# Patient Record
Sex: MALE | Race: BLACK OR AFRICAN AMERICAN | Employment: PART TIME | URBAN - METROPOLITAN AREA
[De-identification: names, ages, dates, MRNs, and addresses within clinical notes are randomized per-mention and may not be internally consistent; named-entity substitution may affect disease eponyms.]

---

## 2017-11-02 ENCOUNTER — HOSPITAL ENCOUNTER (EMERGENCY)
Facility: CLINIC | Age: 22
Discharge: HOME | End: 2017-11-02
Attending: EMERGENCY MEDICINE | Admitting: EMERGENCY MEDICINE
Payer: OTHER MISCELLANEOUS

## 2017-11-02 VITALS
SYSTOLIC BLOOD PRESSURE: 146 MMHG | HEIGHT: 72 IN | DIASTOLIC BLOOD PRESSURE: 82 MMHG | RESPIRATION RATE: 14 BRPM | TEMPERATURE: 97.4 F | WEIGHT: 170 LBS | OXYGEN SATURATION: 100 % | HEART RATE: 86 BPM | BODY MASS INDEX: 23.03 KG/M2

## 2017-11-02 LAB — HIV EXPOSURE DRAW AND HOLD: NORMAL

## 2017-11-02 PROCEDURE — 99283 EMERGENCY DEPT VISIT LOW MDM: CPT

## 2017-11-02 RX ORDER — EMTRICITABINE AND TENOFOVIR DISOPROXIL FUMARATE 200; 300 MG/1; MG/1
1 TABLET, FILM COATED ORAL DAILY
Qty: 30 TABLET | Refills: 0 | Status: SHIPPED | OUTPATIENT
Start: 2017-11-02

## 2017-11-02 NOTE — DISCHARGE INSTRUCTIONS
Body Fluid Exposure (Healthcare Worker)  Two serious illnesses can be transmitted to a healthcare worker through body fluid exposure:    HIV    Hepatitis (types B, C and D)  Most healthcare workers exposed to a patient's body fluid do not get infected. However, exposure must be taken very seriously. Both HIV and hepatitis virus infection can lead to chronic illness and death.  Transmission risk depends on the type of exposure.     The risk of transmission following a needle stick from an HIV positive source is 0.3% (3 out of 1000 exposures).    The risk of transmission following a mucous membrane exposure to an HIV positive source is 0.09% (9 out of 10,000 exposures).    Transmission risk from a hepatitis-B positive source to a non-immunized worker following a needle-stick injury is 6% to 30% (6 to 30 out of 100 exposures).    Transmission risk from a hepatitis C positive source following a needle-stick injury is 0% to 7% (0 to 7 out of 100 exposures), and rare with mucous membrane exposure.  If you are in a sexual relationship, discuss your exposure and its risks with your partner. Consider abstaining from sex or using condoms and avoiding pregnancy until test results of the person who exposed you is negative, or your follow-up testing is done. Do not donate blood, tissue, or semen. If you are a woman who is breast feeding, discuss the risks to your infant with your doctor.  Testing  Initial testing for HIV and hepatitis status will be done both on you and the source, if known. This will establish your HIV and hepatitis status today. If the source is positive or unknown, and your initial results are negative, you will need follow-up blood tests to find out if transmission has occurred. It can take up to 8 weeks for blood tests to turn positive for hepatitis. If HIV infection has occurred, the test usually becomes positive by 3 months after exposure, but a positive result could be delayed up to 6 months after  exposure. Therefore, repeat HIV testing may be done in 6 and 12 weeks, and again at 6 months after exposure, according to your employer's policies. If tests are negative for hepatitis and HIV on final follow-up testing, you can assume that you were not infected as a result of this exposure.  Post-exposure prophylaxis (PEP)  If you have not already been immunized against hepatitis B, you will be offered the vaccine. If you have already been immunized, your antibody status will be determined. Treatment advice will be given based on your antibody status and that of the source patient, if known.   There is no preventive treatment or vaccine for hepatitis C or D.    Based on the time since exposure, the type of  exposure and the HIV status of the patient, if known, preventive treatment with antiviral medicine may be offered. Treatment consists of 2 or 3 oral medicines taken 1 to 3 times a day for 4 weeks. It is recommended to start the treatment as soon as possible after the exposure. Since treatment may be started before test results are known, treatment can be stopped if the source patient test results are negative.  Facts you need to know before making a treatment decision    There is only limited information about the effectiveness of drugs used for post-exposure prophylaxis  and their toxicity in persons without HIV infection.    Although the short-term toxicity of anti-viral drugs is usually limited, serious adverse events have occurred.    Be sure you understand the risk of transmission of disease and the risks of treatment before making your decision. If you are not sure, you can discuss this further with the Employee Health Staff. They can guide you to additional resources.    You may refuse or stop post-exposure prophylaxis treatment at any time.  When to seek medical advice  Call your healthcare provider right away if any of these occur:     Unexplained fever over 100.4 F (38.0 C) or as advised    Swollen  lymph glands    Sore throat    Rash    Muscle or joint aching    Prolonged or recurring diarrhea, nausea, or vomiting    Frequent headaches    Dark urine or light colored stools; or, jaundice (yellow color to skin or eyes)    Abdominal pain    Unusual and prolonged fatigue  Date Last Reviewed: 7/1/2016 2000-2017 [employer]. [address] [place] [address]. All rights reserved. This information is not intended as a substitute for professional medical care. Always follow your healthcare professional's instructions.

## 2017-11-02 NOTE — ED AVS SNAPSHOT
Emergency Department    [address] [facility] [address]    Phone:  [phone]    Fax:  [phone]                                       Oniel Barron   MRN: [id number]    Department:   Emergency Department   Date of Visit:  11/2/2017           After Visit Summary Signature Page     I have received my discharge instructions, and my questions have been answered. I have discussed any challenges I see with this plan with the nurse or doctor.    ..........................................................................................................................................  Patient/Patient Representative Signature      ..........................................................................................................................................  Patient Representative Print Name and Relationship to Patient    ..................................................               ................................................  Date                                            Time    ..........................................................................................................................................  Reviewed by Signature/Title    ...................................................              ..............................................  Date                                                            Time

## 2017-11-02 NOTE — ED PROVIDER NOTES
History     Chief Complaint:  Body Fluid Exposure    HPI   Oniel Barron is a 22 year old male who presents after a body fluid exposure. The patient was poked by an IV needle from a known HIV positive patient while at work. The HIV patient he was working with had just given himself insulin. Mr. Barron is fully immunized.     Allergies:  The patient has no known drug allergies.     Medications:    The patient is currently on no regular medications.    Past Medical History:    History reviewed.  No significant past medical history.     Past Surgical History:    History reviewed.  No significant past surgical history.    Family History:    History reviewed.  No significant family history.    Social History:  Relationship status: Single  Tobacco use: Negative  The patient presents alone.     Review of Systems   All other systems reviewed and are negative.      Physical Exam   First Vitals:  BP: 146/82  Pulse: 86  Temp: 97.4  F (36.3  C)  Resp: 14  Height: 182.9 cm (6')  Weight: 77.1 kg (170 lb)  SpO2: 100 %    Physical Exam  General: Alert, cooperative.    Cardiac:  Normal peripheral pulses.     Musculoskeletal: No focal tenderness, swelling or bony deformities. Normal ROM    Neurological: Alert. No sensory deficit. Normal strength    Skin:    1 mm puncture wound at the end of right index finger.      Emergency Department Course     Emergency Department Course:  Nursing notes and vitals reviewed.  I performed an exam of the patient as documented above.  The above workup was undertaken.  1344: I discussed the patient with Dr. Louis of infectious disease.     Findings and plan explained to the Patient. Patient discharged home, status improved, with instructions regarding supportive care, medications, and reasons to return as well as the importance of close follow-up was reviewed.      Impression & Plan      Medical Decision Making:  Oniel Barron is a 22 year old male who was stuck by an insulin syringe after a  known HIV positive patient used it to give himself insulin. Patient otherwise healthy and fully immunized. Given his exposure, we discussed post exposure prophylaxis, which he was interested in starting, so we will give prescriptions for Truvada and Dolutegravir. Labs drawn per protocol, and patient will follow up with infectious disease in the next week. Return with problems.    Diagnosis:  1. Needle stick injury of the finger, with possible HIV exposure    Disposition:  Discharge to home with infectious disease follow up.    Discharge Medications:   DOLUTEGRAVIR (TIVICAY) 50 MG TABLET    Take 1 tablet (50 mg) by mouth daily    EMTRICITABINE-TENOFOVIR (TRUVADA) 200-300 MG PER TABLET    Take 1 tablet by mouth daily     Stanley HODGSON, abhishek serving as a scribe on 11/2/2017 at 1:29 PM to personally document services performed by Alonzo Lan MD, based on my observations and the provider's statements to me.     EMERGENCY DEPARTMENT       Alonzo Lan MD  11/02/17 6711

## 2017-11-02 NOTE — ED AVS SNAPSHOT
Emergency Department    [address] [facility] [address]    Phone:  [phone]    Fax:  [phone]                                       Oniel Barron   MRN: [id number]    Department:   Emergency Department   Date of Visit:  11/2/2017           Patient Information     Date Of Birth          1995        Your diagnoses for this visit were:     Needle stick injury of finger of right hand, initial encounter        You were seen by Alonzo Lan MD.      Follow-up Information     Schedule an appointment as soon as possible for a visit with HeribertoJose Antonio fagan MD.    Specialty:  Infectious Diseases    Contact information:    [facility]  [address] [address] [phone]          Discharge Instructions         Body Fluid Exposure (Healthcare Worker)  Two serious illnesses can be transmitted to a healthcare worker through body fluid exposure:    HIV    Hepatitis (types B, C and D)  Most healthcare workers exposed to a patient's body fluid do not get infected. However, exposure must be taken very seriously. Both HIV and hepatitis virus infection can lead to chronic illness and death.  Transmission risk depends on the type of exposure.     The risk of transmission following a needle stick from an HIV positive source is 0.3% (3 out of 1000 exposures).    The risk of transmission following a mucous membrane exposure to an HIV positive source is 0.09% (9 out of 10,000 exposures).    Transmission risk from a hepatitis-B positive source to a non-immunized worker following a needle-stick injury is 6% to 30% (6 to 30 out of 100 exposures).    Transmission risk from a hepatitis C positive source following a needle-stick injury is 0% to 7% (0 to 7 out of 100 exposures), and rare with mucous membrane exposure.  If you are in a sexual relationship, discuss your exposure and its risks with your partner. Consider abstaining from sex or using condoms and avoiding  pregnancy until test results of the person who exposed you is negative, or your follow-up testing is done. Do not donate blood, tissue, or semen. If you are a woman who is breast feeding, discuss the risks to your infant with your doctor.  Testing  Initial testing for HIV and hepatitis status will be done both on you and the source, if known. This will establish your HIV and hepatitis status today. If the source is positive or unknown, and your initial results are negative, you will need follow-up blood tests to find out if transmission has occurred. It can take up to 8 weeks for blood tests to turn positive for hepatitis. If HIV infection has occurred, the test usually becomes positive by 3 months after exposure, but a positive result could be delayed up to 6 months after exposure. Therefore, repeat HIV testing may be done in 6 and 12 weeks, and again at 6 months after exposure, according to your employer's policies. If tests are negative for hepatitis and HIV on final follow-up testing, you can assume that you were not infected as a result of this exposure.  Post-exposure prophylaxis (PEP)  If you have not already been immunized against hepatitis B, you will be offered the vaccine. If you have already been immunized, your antibody status will be determined. Treatment advice will be given based on your antibody status and that of the source patient, if known.   There is no preventive treatment or vaccine for hepatitis C or D.    Based on the time since exposure, the type of  exposure and the HIV status of the patient, if known, preventive treatment with antiviral medicine may be offered. Treatment consists of 2 or 3 oral medicines taken 1 to 3 times a day for 4 weeks. It is recommended to start the treatment as soon as possible after the exposure. Since treatment may be started before test results are known, treatment can be stopped if the source patient test results are negative.  Facts you need to know before  making a treatment decision    There is only limited information about the effectiveness of drugs used for post-exposure prophylaxis  and their toxicity in persons without HIV infection.    Although the short-term toxicity of anti-viral drugs is usually limited, serious adverse events have occurred.    Be sure you understand the risk of transmission of disease and the risks of treatment before making your decision. If you are not sure, you can discuss this further with the Employee Health Staff. They can guide you to additional resources.    You may refuse or stop post-exposure prophylaxis treatment at any time.  When to seek medical advice  Call your healthcare provider right away if any of these occur:     Unexplained fever over 100.4 F (38.0 C) or as advised    Swollen lymph glands    Sore throat    Rash    Muscle or joint aching    Prolonged or recurring diarrhea, nausea, or vomiting    Frequent headaches    Dark urine or light colored stools; or, jaundice (yellow color to skin or eyes)    Abdominal pain    Unusual and prolonged fatigue  Date Last Reviewed: 7/1/2016 2000-2017 [employer]. [address] [place] [address]. All rights reserved. This information is not intended as a substitute for professional medical care. Always follow your healthcare professional's instructions.          24 Hour Appointment Hotline       To make an appointment at any [facility] clinic, call [phone] ([phone]). If you don't have a family doctor or clinic, we will help you find one. [facility] clinics are conveniently located to serve the needs of you and your family.             Review of your medicines      START taking        Dose / Directions Last dose taken    dolutegravir 50 MG tablet   Commonly known as:  TIVICAY   Dose:  50 mg   Quantity:  30 tablet        Take 1 tablet (50 mg) by mouth daily   Refills:  0        emtricitabine-tenofovir 200-300 MG per tablet   Commonly known as:  TRUVADA    Dose:  1 tablet   Quantity:  30 tablet        Take 1 tablet by mouth daily   Refills:  0          Our records show that you are taking the medicines listed below. If these are incorrect, please call your family doctor or clinic.        Dose / Directions Last dose taken    NO ACTIVE MEDICATIONS        Refills:  0        salicylic acid 40 % Misc   Commonly known as:  MEDIPLAST   Dose:  1 dose.   Quantity:  1 each        Apply 1 dose topically At Bedtime. Each night, Scrape or shave the wart(s) with pumice stone or callous file and then soak foot for 10-15 min in warm water.  Cut plaster to fit over 1 wart each.  Use self adherant tape each in place for overnight and remove the next morning.   Refills:  prn                Prescriptions were sent or printed at these locations (2 Prescriptions)                   Other Prescriptions                Printed at Department/Unit printer (2 of 2)         emtricitabine-tenofovir (TRUVADA) 200-300 MG per tablet               dolutegravir (TIVICAY) 50 MG tablet                Orders Needing Specimen Collection     None      Pending Results     No orders found from 10/31/2017 to 11/3/2017.            Pending Culture Results     No orders found from 10/31/2017 to 11/3/2017.            Pending Results Instructions     If you had any lab results that were not finalized at the time of your Discharge, you can call the ED Lab Result RN at [phone]. You will be contacted by this team for any positive Lab results or changes in treatment. The nurses are available 7 days a week from 10A to 6:30P.  You can leave a message 24 hours per day and they will return your call.        Test Results From Your Hospital Stay               Clinical Quality Measure: Blood Pressure Screening     Your blood pressure was checked while you were in the emergency department today. The last reading we obtained was  BP: 146/82 . Please read the guidelines below about what these numbers mean and what you  "should do about them.  If your systolic blood pressure (the top number) is less than 120 and your diastolic blood pressure (the bottom number) is less than 80, then your blood pressure is normal. There is nothing more that you need to do about it.  If your systolic blood pressure (the top number) is 120-139 or your diastolic blood pressure (the bottom number) is 80-89, your blood pressure may be higher than it should be. You should have your blood pressure rechecked within a year by a primary care provider.  If your systolic blood pressure (the top number) is 140 or greater or your diastolic blood pressure (the bottom number) is 90 or greater, you may have high blood pressure. High blood pressure is treatable, but if left untreated over time it can put you at risk for heart attack, stroke, or kidney failure. You should have your blood pressure rechecked by a primary care provider within the next 4 weeks.  If your provider in the emergency department today gave you specific instructions to follow-up with your doctor or provider even sooner than that, you should follow that instruction and not wait for up to 4 weeks for your follow-up visit.        Thank you for choosing [facility]       Thank you for choosing [facility] for your care. Our goal is always to provide you with excellent care. Hearing back from our patients is one way we can continue to improve our services. Please take a few minutes to complete the written survey that you may receive in the mail after you visit with us. Thank you!        [employer] Information     [employer] lets you send messages to your doctor, view your test results, renew your prescriptions, schedule appointments and more. To sign up, go to [email or web address] . Click on \"Log in\" on the left side of the screen, which will take you to the Welcome page. Then click on \"Sign up Now\" on the right side of the page.     You will be asked to enter the access code listed below, as well as some " personal information. Please follow the directions to create your username and password.     Your access code is: [email or web address]  Expires: 2018  2:41 PM     Your access code will  in 90 days. If you need help or a new code, please call your [facility] clinic or [phone].        Care EveryWhere ID     This is your Care EveryWhere ID. This could be used by other organizations to access your [facility] medical records  [id number]        Equal Access to Services     [facility] : Roseanne blanco Soluly, waaxda luqadaha, qaybta kaalmada jesus, lorenzo landin . So [facility] [phone].    ATENCIÓN: Si habla español, tiene a christine disposición servicios gratuitos de asistencia lingüística. Llame al [phone].    We comply with applicable federal civil rights laws and [place] laws. We do not discriminate on the basis of race, color, national origin, age, disability, sex, sexual orientation, or gender identity.            After Visit Summary       This is your record. Keep this with you and show to your community pharmacist(s) and doctor(s) at your next visit.

## 2017-11-03 ENCOUNTER — TELEPHONE (OUTPATIENT)
Dept: EMERGENCY MEDICINE | Facility: CLINIC | Age: 22
End: 2017-11-03

## 2017-11-03 LAB
HBV CORE AB SERPL QL IA: NONREACTIVE
HBV SURFACE AB SERPL IA-ACNC: 0.51 M[IU]/ML
HBV SURFACE AG SERPL QL IA: NONREACTIVE
HCV AB SERPL QL IA: NONREACTIVE
HIV 1+2 AB+HIV1 P24 AG SERPL QL IA: NONREACTIVE

## 2017-11-03 NOTE — TELEPHONE ENCOUNTER
[facility] Emergency Department Lab result notification:    [facility] ED lab result protocol used  Blood and Body Fluid Exposure    Reason for call  Notify of lab results, assess symptoms,  review ED providers recommendations/discharge instructions (if necessary) and advise per ED lab result f/u protocol    Lab Result  The following blood and bodily fluid lab result were all negative for: Hepatitis C antibody, Hepatitis B surface antigen,  Hepatitis B surface antibody, Hepatitis B Core antibody, and HIV Antigen Antibody Combo.   Patient to be notified of result and advised per [facility] ED lab result protocol    Information table from ED Provider visit on 11/2/17  Symptoms reported at ED visit (Chief complaint, HPI) Oniel Barron is a 22 year old male who presents after a body fluid exposure. The patient was poked by an IV needle from a known HIV positive patient while at work. The HIV patient he was working with had just given himself insulin. Mr. Braron is fully immunized.    ED providers Impression and Plan (applicable information) Oniel Barron is a 22 year old male who was stuck by an insulin syringe after a known HIV positive patient used it to give himself insulin. Patient otherwise healthy and fully immunized. Given his exposure, we discussed post exposure prophylaxis, which he was interested in starting, so we will give prescriptions for Truvada and Dolutegravir. Labs drawn per protocol, and patient will follow up with infectious disease in the next week. Return with problems.   Miscellaneous information Hep B surface antibody : Non reactive, ED provide reports he is fully immunized.     RN Assessment (Patient s current Symptoms), include time called.  [Insert Left message here if message left]  Msg left at 10:50 am.     PCP follow-up Questions asked: NO    Rocio Levine RN    [facility] RN  Lung Nodule and ED Lab Results F/U RN  Epic pool (ED late result f/u RN) : P  [id number]   # [phone]

## 2018-10-13 ENCOUNTER — HOSPITAL ENCOUNTER (EMERGENCY)
Facility: CLINIC | Age: 23
Discharge: HOME | End: 2018-10-13
Attending: EMERGENCY MEDICINE | Admitting: EMERGENCY MEDICINE

## 2018-10-13 VITALS
DIASTOLIC BLOOD PRESSURE: 86 MMHG | SYSTOLIC BLOOD PRESSURE: 132 MMHG | RESPIRATION RATE: 20 BRPM | TEMPERATURE: 98.7 F | HEART RATE: 82 BPM | OXYGEN SATURATION: 99 %
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
## 2018-10-13 DIAGNOSIS — S01.81XA FACIAL LACERATION, INITIAL ENCOUNTER: ICD-10-CM

## 2018-10-13 PROCEDURE — 90715 TDAP VACCINE 7 YRS/> IM: CPT | Performed by: EMERGENCY MEDICINE

## 2018-10-13 PROCEDURE — 12011 RPR F/E/E/N/L/M 2.5 CM/<: CPT

## 2018-10-13 PROCEDURE — 25000128 H RX IP 250 OP 636: Performed by: EMERGENCY MEDICINE

## 2018-10-13 PROCEDURE — 90471 IMMUNIZATION ADMIN: CPT

## 2018-10-13 PROCEDURE — 99283 EMERGENCY DEPT VISIT LOW MDM: CPT | Mod: 25

## 2018-10-13 RX ORDER — LIDOCAINE HYDROCHLORIDE 10 MG/ML
INJECTION, SOLUTION INFILTRATION; PERINEURAL
Status: DISCONTINUED
Start: 2018-10-13 | End: 2018-10-13 | Stop reason: HOSPADM

## 2018-10-13 RX ADMIN — CLOSTRIDIUM TETANI TOXOID ANTIGEN (FORMALDEHYDE INACTIVATED), CORYNEBACTERIUM DIPHTHERIAE TOXOID ANTIGEN (FORMALDEHYDE INACTIVATED), BORDETELLA PERTUSSIS TOXOID ANTIGEN (GLUTARALDEHYDE INACTIVATED), BORDETELLA PERTUSSIS FILAMENTOUS HEMAGGLUTININ ANTIGEN (FORMALDEHYDE INACTIVATED), BORDETELLA PERTUSSIS PERTACTIN ANTIGEN, AND BORDETELLA PERTUSSIS FIMBRIAE 2/3 ANTIGEN 0.5 ML: 5; 2; 2.5; 5; 3; 5 INJECTION, SUSPENSION INTRAMUSCULAR at 15:38

## 2018-10-13 ASSESSMENT — ENCOUNTER SYMPTOMS
HEADACHES: 1
VOMITING: 0
WOUND: 1
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
## 2018-10-13 NOTE — DISCHARGE INSTRUCTIONS
The sutures will dissolve on own. Only need to have them removed if in for longer than 7 days  Avoid foods that could stuck in the wound  Wash mouth with water after eating    Discharge Instructions  Laceration (Cut)    You were seen today for a laceration (cut).  Your provider examined your laceration for any problems such a buried foreign body (like glass, a splinter, or gravel), or injury to blood vessels, tendons, and nerves.  Your provider may have also rinsed and/or scrubbed your laceration to help prevent an infection. It may not be possible to find all problems with your laceration on the first visit; occasionally foreign bodies or a tendon injury can go undetected.    Your laceration may have been closed in one of several ways:    No closure: many wounds will heal just fine without closure.    Stitches: regular stitches that require removal.    Staples: skin staples are often used in the scalp/head.    Wound adhesive (glue): skin glue can be used for certain lacerations and doesn t require removal.    Wound strips (aka Butterfly bandages or steri-strips): these are bandages that help to close a wound.    Absorbable stitches:  dissolving  stitches that go away on their own and usually don t require removal.    A small percentage of wounds will develop an infection regardless of how well the wound is cared for. Antibiotics are generally not indicated to prevent an infection so are only given for a small number of high-risk wounds. Some lacerations are too high risk to close, and are left open to heal because closure can increase the likelihood that an infection will develop.    Remember that all lacerations, no matter how expertly repaired, will cause scarring. We consider many factors, techniques, and materials, in our efforts to provide the best possible cosmetic outcome.    Generally, every Emergency Department visit should have a follow-up clinic visit with either a primary or a specialty clinic/provider.  Please follow-up as instructed by your emergency provider today.     Return to the Emergency Department right away if:    You have more redness, swelling, pain, drainage (pus), a bad smell, or red streaking from your laceration as these symptoms could indicate an infection.    You have a fever of 100.4 F or more.    You have bleeding that you cannot stop at home. If your cut starts to bleed, hold pressure on the bleeding area with a clean cloth or put pressure over the bandage.  If the bleeding does not stop after using constant pressure for 30 minutes, you should return to the Emergency Department for further treatment.    An area past the laceration is cool, pale, or blue compared with the other side, or has a slower return of color when squeezed.    Your dressing seems too tight or starts to get uncomfortable or painful. For children, signs of a problem might be irritability or restlessness.    You have loss of normal function or use of an area, such as being unable to straighten or bend a finger normally.    You have a numb area past the laceration.    Return to the Emergency Department or see your regular provider if:    The laceration starts to come open.     You have something coming out of the cut or a feeling that there is something in the laceration.    Your wound will not heal, or keeps breaking open. There can always be glass, wood, dirt or other things in any wound.  They will not always show up, even on x-rays.  If a wound does not heal, this may be why, and it is important to follow-up with your regular provider.    Home Care:    Take your dressing off in 12-24 hours, or as instructed by your provider, to check your laceration. Remove the dressing sooner if it seems too tight or painful, or if it is getting numb, tingly, or pale past the dressing.    Gently wash your laceration 1-2 times daily with clean water and mild soap. It is okay to shower or run clean water over the laceration, but do not let  the laceration soak in water (no swimming).    If your laceration was closed with wound adhesive or strips: pat it dry and leave it open to the air. For all other repairs: after you wash your laceration, or at least 2 times a day, apply antibiotic ointment (such as Neosporin  or Bacitracin ) to the laceration, then cover it with a Band-Aid  or gauze.    Keep the laceration clean. Wear gloves or other protective clothing if you are around dirt.    Follow-up for removal:    If your wound was closed with staples or regular stitches, they need to be removed according to the instructions and timeline specified by your provider today.    If your wound was closed with absorbable ( dissolving ) sutures, they should fall out, dissolve, or not be visible in about one week. If they are still visible, then they should be removed according to the instructions and timeline specified by your provider today.    Scars:  To help minimize scarring:    Wear sunscreen over the healed laceration when out in the sun.    Massage the area regularly once healed.    You may apply Vitamin E to the healed wound.    Wait. Scars improve in appearance over months and years.    If you were given a prescription for medicine here today, be sure to read all of the information (including the package insert) that comes with your prescription.  This will include important information about the medicine, its side effects, and any warnings that you need to know about.  The pharmacist who fills the prescription can provide more information and answer questions you may have about the medicine.  If you have questions or concerns that the pharmacist cannot address, please call or return to the Emergency Department.       Remember that you can always come back to the Emergency Department if you are not able to see your regular provider in the amount of time listed above, if you get any new symptoms, or if there is anything that worries you.
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
## 2018-10-13 NOTE — ED NOTES
Patient tolerated suture placement. No bleeding noted. MD in to see patient and discuss diagnosis, test results, and discharge plan. Patient meets discharge criteria. Discussed AVS with patient. Questions answered. Patient verbalized understanding. Patient reports being ready to go home. Patient discharged home by car with all necessary information.

## 2018-10-13 NOTE — ED AVS SNAPSHOT
[facility]    [address]    [facility] [address]    Phone:  [phone]    Fax:  [phone]                                       Oniel Barron   MRN: [id number]    Department:  [facility] Emergency Department   Date of Visit:  10/13/2018           After Visit Summary Signature Page     I have received my discharge instructions, and my questions have been answered. I have discussed any challenges I see with this plan with the nurse or doctor.    ..........................................................................................................................................  Patient/Patient Representative Signature      ..........................................................................................................................................  Patient Representative Print Name and Relationship to Patient    ..................................................               ................................................  Date                                   Time    ..........................................................................................................................................  Reviewed by Signature/Title    ...................................................              ..............................................  Date                                               Time          22EPIC Rev 08/18